# Patient Record
Sex: MALE | Race: WHITE
[De-identification: names, ages, dates, MRNs, and addresses within clinical notes are randomized per-mention and may not be internally consistent; named-entity substitution may affect disease eponyms.]

---

## 2018-09-20 ENCOUNTER — HOSPITAL ENCOUNTER (OUTPATIENT)
Dept: HOSPITAL 47 - OR | Age: 73
Discharge: HOME | End: 2018-09-20
Attending: OTOLARYNGOLOGY
Payer: MEDICARE

## 2018-09-20 VITALS — SYSTOLIC BLOOD PRESSURE: 157 MMHG | RESPIRATION RATE: 20 BRPM | DIASTOLIC BLOOD PRESSURE: 78 MMHG | HEART RATE: 73 BPM

## 2018-09-20 VITALS — BODY MASS INDEX: 29.8 KG/M2

## 2018-09-20 VITALS — TEMPERATURE: 97.6 F

## 2018-09-20 DIAGNOSIS — J33.8: ICD-10-CM

## 2018-09-20 DIAGNOSIS — I10: ICD-10-CM

## 2018-09-20 DIAGNOSIS — Z85.46: ICD-10-CM

## 2018-09-20 DIAGNOSIS — J43.9: ICD-10-CM

## 2018-09-20 DIAGNOSIS — E11.9: ICD-10-CM

## 2018-09-20 DIAGNOSIS — Z79.52: ICD-10-CM

## 2018-09-20 DIAGNOSIS — Z79.899: ICD-10-CM

## 2018-09-20 DIAGNOSIS — Z88.8: ICD-10-CM

## 2018-09-20 DIAGNOSIS — Z79.82: ICD-10-CM

## 2018-09-20 DIAGNOSIS — Z79.891: ICD-10-CM

## 2018-09-20 DIAGNOSIS — J32.4: Primary | ICD-10-CM

## 2018-09-20 DIAGNOSIS — Z79.2: ICD-10-CM

## 2018-09-20 DIAGNOSIS — J34.3: ICD-10-CM

## 2018-09-20 DIAGNOSIS — J34.2: ICD-10-CM

## 2018-09-20 DIAGNOSIS — Z79.51: ICD-10-CM

## 2018-09-20 DIAGNOSIS — I42.2: ICD-10-CM

## 2018-09-20 LAB
ANION GAP SERPL CALC-SCNC: 9 MMOL/L
BUN SERPL-SCNC: 12 MG/DL (ref 9–20)
CALCIUM SPEC-MCNC: 9.4 MG/DL (ref 8.4–10.2)
CHLORIDE SERPL-SCNC: 107 MMOL/L (ref 98–107)
CO2 SERPL-SCNC: 28 MMOL/L (ref 22–30)
GLUCOSE SERPL-MCNC: 110 MG/DL (ref 74–99)
POTASSIUM SERPL-SCNC: 4.2 MMOL/L (ref 3.5–5.1)
SODIUM SERPL-SCNC: 144 MMOL/L (ref 137–145)

## 2018-09-20 PROCEDURE — 31253 NSL/SINS NDSC TOTAL: CPT

## 2018-09-20 PROCEDURE — 30140 RESECT INFERIOR TURBINATE: CPT

## 2018-09-20 PROCEDURE — 88305 TISSUE EXAM BY PATHOLOGIST: CPT

## 2018-09-20 PROCEDURE — 88300 SURGICAL PATH GROSS: CPT

## 2018-09-20 PROCEDURE — 30520 REPAIR OF NASAL SEPTUM: CPT

## 2018-09-20 PROCEDURE — 31267 ENDOSCOPY MAXILLARY SINUS: CPT

## 2018-09-20 PROCEDURE — 80048 BASIC METABOLIC PNL TOTAL CA: CPT

## 2018-09-20 PROCEDURE — 31259 NSL/SINS NDSC SPHN TISS RMVL: CPT

## 2018-09-20 RX ADMIN — OXYMETAZOLINE HCL ONE SPRAY: 0.05 SPRAY NASAL at 06:50

## 2018-09-20 RX ADMIN — OXYMETAZOLINE HCL ONE SPRAY: 0.05 SPRAY NASAL at 06:36

## 2018-09-20 RX ADMIN — OXYMETAZOLINE HCL ONE SPRAY: 0.05 SPRAY NASAL at 07:05

## 2018-09-20 RX ADMIN — OXYMETAZOLINE HCL ONE SPRAY: 0.05 SPRAY NASAL at 07:00

## 2018-09-20 RX ADMIN — OXYMETAZOLINE HCL ONE SPRAY: 0.05 SPRAY NASAL at 06:41

## 2018-09-20 NOTE — P.OP
Date of Procedure: 09/20/18


Preoperative Diagnosis: 


Deviated nasal septum


Bilateral hypertrophy of inferior nasal turbinates with obstruction


Chronic pansinusitis


Sinonasal polyposis


Postoperative Diagnosis: 


Same


Procedure(s) Performed: 


Septoplasty


Bilateral submucosal resection of the inferior turbinates with outfracturing 

compression


Bilateral functional endoscopic sinus surgery of all sinuses with polypectomy 

and intranasal polypectomy


Anesthesia: BRENTON


Surgeon: Pipo Stanley


Estimated Blood Loss (ml): 40


Pathology: other (Sinonasal)


Condition: stable


Disposition: PACU


Indications for Procedure: 


This patient presented to the office with multiple and long-standing sinonasal 

symptoms.  He has chronic pansinusitis with polyps and left septal deviation.  

He has complaints of discolored postnasal drainage, total anosmia, facial pain 

and pressure, discolored drainage, etc. etc.  He has failed medical therapy and 

is yellow drainage is persistent.  His problems have been going on for years 

and has failed medical therapy.  CAT scan evaluation confirms chronic 

pansinusitis.  This is a medical failure and after long discussion we decided 

to proceed forward with this septoplasty turbinate surgery and bilateral 

functional endoscopic sinus surgery and polypectomy.  All risks, benefits, and 

alternative therapies were discussed.  Consent was obtained and all questions 

were answered.


Operative Findings: 


Massive intranasal edema with a left septal deviation and sinonasal polyposis 

throughout all sinuses with pipo pus and thick and viscous secretions removed.

  All sinuses were infected and had polyposis.


Description of Procedure: 


This patient was taken to the operative room and placed in the supine position.

  A general inhalation anesthetic was administered to the patient by the 

department of anesthesia with a functioning IV line in place.  The patient was 

monitored throughout the entire case by the department of anesthesia.  The eyes 

were taped shut for protection.  The patient was placed in a slight reverse 

Trendelenburg position.  The patient had previously utilize Afrin nasal spray 

preoperatively.  The nose was evaluated and the septum lateral nasal wall and 

inferior turbinates were injected with lidocaine 1% with epinephrine 1 100,000 

bilaterally.  Approximately 10 minutes were allowed wait for full 

vasoconstrictive effects to take place.





At this point a caudal incision was made over the caudal portion of the left 

septum down to the mucoperichondrium.  A mucoperichondrial flap was elevated on 

the left side and dissection was carried with use of tunnels posteriorly.  We 

then made a crossover incision through the cartilage to the contralateral side 

and for the mucoperichondrial flap development was performed to the extent of 

visualization on the contralateral side.  After the cartilage was freed with 

use of several crosshatching incisions and removal of some redundant strips of 

septal cartilage, the septum was straightened and placed back in the midline.  

The septum was sutured fixated to the ovarian groove.  Excellent straightening 

occurred and the septum was visibly straight.  Incision was closed with a 40 

rapid Vicryl.  We utilized a running nonlocking fashion for closure of the 

incision.  A quilting stitch was used to reapproximate the septal flaps with 

use of a 40 rapid Vicryl.





We then entered the nose with a 0 and 30 Méndez claus endoscope.  Previous 

to this we did inject the lateral nasal wall and middle turbinate and uncinate 

process with lidocaine 1% with epinephrine 1 100,000.  Approximately 10 minutes 

were allowed wait for full vasoconstrictive effects to take place.  Intranasal 

polyps were noted.  They were noted bilaterally.  The intranasal polyps were 

removed with use of a microdebrider.  With use of a microdebrider and a 

pediatric backbiter, we took down the uncinate process bilaterally.  We then 

opened the maxillary sinuses bilaterally.  We utilized a microdebrider for this 

and entered the maxillary sinuses and removed diseased tissue and polypoid 

tissue.  This was done bilaterally.  After the maxillary sinuses were opened 

and the diseased tissue and polyps were removed we entered the ethmoid bulla 

and with use of a microdebrider and up-biting Ludivina, we remove the 

anterior septations and remove diseased tissue from the anterior ethmoids with 

direct visualization.  We then followed the fovea frontalis through the basal 

lamella and into the posterior ethmoid air cells and did a total ethmoidectomy 

with removal of polypoid material.  Once the ethmoids  cells were all taken 

down we then entered the sphenoid sinus medially and inferiorly underneath the 

inferior attachment of the superior turbinate.  The sphenoid sinus was opened 

entered and diseased tissue and polyps were removed bilaterally.  This was done 

with a microdebrider and Blakesley.  We then entered the frontal sinuses with a 

giraffe and up-biting Blakesley entered on the agar nasi cells.  We open the 

frontal sinuses and removed sinus tissue and polypoid tissue that was diseased.

  We explored the frontal sinuses bilaterally.  To summarize all sinuses were 

open all sinuses were explored and we remove diseased tissue and polyps from 

the sphenoid maxillary and frontal sinuses.  Polyps were removed from the nose.

  Ethmoid sinuses were opened totally. Surgicel powder was inserted and minimal 

bleeding was encountered.  We reinspected the skull base there is no signs of 

any orbital penetration or signs of any intracranial penetration.  The sugical 

site was reinspected after the nasal pore was placed and no bleeding was seen.











Attention was then paid to the inferior turbinates.  The bilateral inferior 

turbinates were hypertrophic and obstructive.  We entered the anterior portion 

of the inferior turbinates with use of a microdebrider.  We remove bone and 

submucosal elements with use of a microdebrider bilaterally.  The inferior 

turbinates underwent a submucosal resection with removal of submucosal tissue 

and bone.  We obtained a much better and normal in size for breathing.  The 

inferior turbinates were then outfractured and compressed with a [employer] nasal 

elevator.  Excellent airway was obtained and was symmetric bilaterally.  No 

bleeding was encountered.





Intranasal splints were inserted and fixated at the end of the case.  We 

utilized [place] nasal splints.  There will be removed and the patient returns to 

the office.

## 2025-01-17 ENCOUNTER — HOSPITAL ENCOUNTER (OUTPATIENT)
Dept: HOSPITAL 47 - LABPAT | Age: 80
Discharge: HOME | End: 2025-01-17
Attending: ORTHOPAEDIC SURGERY
Payer: MEDICARE

## 2025-01-17 DIAGNOSIS — M16.11: ICD-10-CM

## 2025-01-17 DIAGNOSIS — Z01.818: Primary | ICD-10-CM

## 2025-01-17 DIAGNOSIS — Z22.322: ICD-10-CM

## 2025-01-17 LAB
ALBUMIN SERPL-MCNC: 4.1 G/DL (ref 3.8–4.9)
ALBUMIN/GLOB SERPL: 1.58 RATIO (ref 1.6–3.17)
ALP SERPL-CCNC: 70 U/L (ref 41–126)
ALT SERPL-CCNC: 13 U/L (ref 10–49)
ANION GAP SERPL CALC-SCNC: 11.1 MMOL/L (ref 4–12)
APTT BLD: 22.9 SEC (ref 22–30)
AST SERPL-CCNC: 16 U/L (ref 14–35)
BUN SERPL-SCNC: 13.5 MG/DL (ref 9–27)
BUN/CREAT SERPL: 16.88 RATIO (ref 12–20)
CALCIUM SPEC-MCNC: 9.5 MG/DL (ref 8.7–10.3)
CHLORIDE SERPL-SCNC: 103 MMOL/L (ref 96–109)
CO2 SERPL-SCNC: 26.9 MMOL/L (ref 21.6–31.8)
ERYTHROCYTE [DISTWIDTH] IN BLOOD BY AUTOMATED COUNT: 4.2 X 10*6/UL (ref 4.4–5.6)
ERYTHROCYTE [DISTWIDTH] IN BLOOD: 12.5 % (ref 11.5–14.5)
GLOBULIN SER CALC-MCNC: 2.6 G/DL (ref 1.6–3.3)
GLUCOSE SERPL-MCNC: 89 MG/DL (ref 70–110)
HCT VFR BLD AUTO: 38.4 % (ref 39.6–50)
HGB BLD-MCNC: 12.8 G/DL (ref 13–17)
INR PPP: 0.9 (ref ?–1.2)
MCH RBC QN AUTO: 30.5 PG (ref 27–32)
MCHC RBC AUTO-ENTMCNC: 33.3 G/DL (ref 32–37)
MCV RBC AUTO: 91.4 FL (ref 80–97)
NRBC BLD AUTO-RTO: 0 X 10*3/UL (ref 0–0.01)
PLATELET # BLD AUTO: 200 X 10*3/UL (ref 140–440)
POTASSIUM SERPL-SCNC: 4.1 MMOL/L (ref 3.5–5.5)
PROT SERPL-MCNC: 6.7 G/DL (ref 6.2–8.2)
PT BLD: 10.1 SEC (ref 10–12.5)
SODIUM SERPL-SCNC: 141 MMOL/L (ref 135–145)
WBC # BLD AUTO: 6.2 X 10*3/UL (ref 4.5–10)

## 2025-01-17 PROCEDURE — 80053 COMPREHEN METABOLIC PANEL: CPT

## 2025-01-17 PROCEDURE — 86850 RBC ANTIBODY SCREEN: CPT

## 2025-01-17 PROCEDURE — 87070 CULTURE OTHR SPECIMN AEROBIC: CPT

## 2025-01-17 PROCEDURE — 86901 BLOOD TYPING SEROLOGIC RH(D): CPT

## 2025-01-17 PROCEDURE — 85730 THROMBOPLASTIN TIME PARTIAL: CPT

## 2025-01-17 PROCEDURE — 85610 PROTHROMBIN TIME: CPT

## 2025-01-17 PROCEDURE — 85027 COMPLETE CBC AUTOMATED: CPT

## 2025-01-17 PROCEDURE — 93005 ELECTROCARDIOGRAM TRACING: CPT

## 2025-01-17 PROCEDURE — 86900 BLOOD TYPING SEROLOGIC ABO: CPT

## 2025-01-27 ENCOUNTER — HOSPITAL ENCOUNTER (OUTPATIENT)
Dept: HOSPITAL 47 - OR | Age: 80
LOS: 1 days | Discharge: HOME HEALTH | End: 2025-01-28
Attending: ORTHOPAEDIC SURGERY
Payer: MEDICARE

## 2025-01-27 DIAGNOSIS — M16.11: Primary | ICD-10-CM

## 2025-01-27 DIAGNOSIS — I25.10: ICD-10-CM

## 2025-01-27 DIAGNOSIS — Z79.51: ICD-10-CM

## 2025-01-27 DIAGNOSIS — J44.9: ICD-10-CM

## 2025-01-27 DIAGNOSIS — I42.2: ICD-10-CM

## 2025-01-27 DIAGNOSIS — E11.9: ICD-10-CM

## 2025-01-27 DIAGNOSIS — Z79.84: ICD-10-CM

## 2025-01-27 DIAGNOSIS — I10: ICD-10-CM

## 2025-01-27 DIAGNOSIS — G89.18: ICD-10-CM

## 2025-01-27 DIAGNOSIS — Z79.82: ICD-10-CM

## 2025-01-27 LAB
GLUCOSE BLD-MCNC: 125 MG/DL (ref 70–110)
GLUCOSE BLD-MCNC: 160 MG/DL (ref 70–110)
GLUCOSE BLD-MCNC: 175 MG/DL (ref 70–110)

## 2025-01-27 PROCEDURE — 27130 TOTAL HIP ARTHROPLASTY: CPT

## 2025-01-27 PROCEDURE — 85025 COMPLETE CBC W/AUTO DIFF WBC: CPT

## 2025-01-27 PROCEDURE — 97161 PT EVAL LOW COMPLEX 20 MIN: CPT

## 2025-01-27 PROCEDURE — 64999 UNLISTED PX NERVOUS SYSTEM: CPT

## 2025-01-27 PROCEDURE — 97166 OT EVAL MOD COMPLEX 45 MIN: CPT

## 2025-01-27 PROCEDURE — 94640 AIRWAY INHALATION TREATMENT: CPT

## 2025-01-27 PROCEDURE — 73501 X-RAY EXAM HIP UNI 1 VIEW: CPT

## 2025-01-27 PROCEDURE — 83036 HEMOGLOBIN GLYCOSYLATED A1C: CPT

## 2025-01-27 RX ADMIN — Medication SCH MCG: at 20:36

## 2025-01-27 RX ADMIN — CEFAZOLIN ONE MLS: 330 INJECTION, POWDER, FOR SOLUTION INTRAMUSCULAR; INTRAVENOUS at 06:57

## 2025-01-27 RX ADMIN — MELOXICAM PRN MG: 7.5 TABLET ORAL at 06:32

## 2025-01-27 RX ADMIN — DOCUSATE SODIUM AND SENNOSIDES SCH EACH: 50; 8.6 TABLET ORAL at 20:36

## 2025-01-27 RX ADMIN — GABAPENTIN PRN MG: 300 CAPSULE ORAL at 06:32

## 2025-01-27 RX ADMIN — POTASSIUM CHLORIDE ONE MLS: 14.9 INJECTION, SOLUTION INTRAVENOUS at 05:58

## 2025-01-27 RX ADMIN — ONDANSETRON ONE MG: 2 INJECTION INTRAMUSCULAR; INTRAVENOUS at 06:38

## 2025-01-27 RX ADMIN — BUDESONIDE AND FORMOTEROL FUMARATE DIHYDRATE SCH PUFF: 160; 4.5 AEROSOL RESPIRATORY (INHALATION) at 20:43

## 2025-01-27 RX ADMIN — NIFEDIPINE SCH MG: 30 TABLET, FILM COATED, EXTENDED RELEASE ORAL at 17:28

## 2025-01-27 RX ADMIN — METOPROLOL TARTRATE SCH MG: 50 TABLET, FILM COATED ORAL at 20:35

## 2025-01-27 RX ADMIN — ASPIRIN 325 MG ORAL TABLET SCH MG: 325 PILL ORAL at 20:35

## 2025-01-27 RX ADMIN — MIDAZOLAM PRN MG: 1 INJECTION INTRAMUSCULAR; INTRAVENOUS at 06:34

## 2025-01-27 RX ADMIN — HYDROMORPHONE HYDROCHLORIDE PRN MG: 1 INJECTION, SOLUTION INTRAMUSCULAR; INTRAVENOUS; SUBCUTANEOUS at 13:22

## 2025-01-27 RX ADMIN — HYDROMORPHONE HYDROCHLORIDE PRN MG: 1 INJECTION, SOLUTION INTRAMUSCULAR; INTRAVENOUS; SUBCUTANEOUS at 10:24

## 2025-01-27 RX ADMIN — POTASSIUM CHLORIDE ONE MLS: 14.9 INJECTION, SOLUTION INTRAVENOUS at 08:33

## 2025-01-27 RX ADMIN — ACETAMINOPHEN PRN MG: 500 TABLET ORAL at 06:32

## 2025-01-27 RX ADMIN — POTASSIUM CHLORIDE SCH MEQ: 750 TABLET, EXTENDED RELEASE ORAL at 20:36

## 2025-01-27 RX ADMIN — BETAMETHASONE DIPROPIONATE SCH: 0.5 OINTMENT TOPICAL at 21:47

## 2025-01-27 RX ADMIN — SODIUM CHLORIDE ONE MG: 9 INJECTION, SOLUTION INTRAVENOUS at 08:18

## 2025-01-27 RX ADMIN — CEFAZOLIN SCH: 330 INJECTION, POWDER, FOR SOLUTION INTRAMUSCULAR; INTRAVENOUS at 13:21

## 2025-01-27 RX ADMIN — SODIUM CHLORIDE ONE MG: 9 INJECTION, SOLUTION INTRAVENOUS at 07:40

## 2025-01-27 RX ADMIN — POTASSIUM CHLORIDE SCH MLS/HR: 14.9 INJECTION, SOLUTION INTRAVENOUS at 06:32

## 2025-01-27 RX ADMIN — INSULIN ASPART SCH UNIT: 100 INJECTION, SOLUTION INTRAVENOUS; SUBCUTANEOUS at 17:28

## 2025-01-27 RX ADMIN — HYDROCODONE BITARTRATE AND ACETAMINOPHEN PRN EACH: 7.5; 325 TABLET ORAL at 16:32

## 2025-01-27 NOTE — P.ANPRN
Procedure Note - Anesthesia





- Nerve Block Performed


  ** Right Brian Single


Time Out Performed: Yes


Date of Procedure: 01/27/25


Procedure Start Time: 06:33


Procedure Stop Time: 06:38


Location of Patient: PreOp


Indication: Acute Post-Operative Pain, Analgesia, Requested by Surgeon


Sedation Type: Sedate with meaningful contact maintained


Position: Supine


Catheter: None


Needle Types: Pajunk


Needle Gauge: 21


Ultrasound used to visualize needle placement: Yes


Ultrasound used to observe medication spread: Yes


Injectate: 0.5% Ropivacaine (see comment for volume) (Ropiv 20ml)


Blood Aspirated: No


Pain Paresthesia on Injection Noted: No


Resistance on Injection: Normal


Image Stored and Saved: Yes


Events: Uneventful and Well Tolerated

## 2025-01-27 NOTE — P.OP
Date of Procedure: 01/27/25


Preoperative Diagnosis: 


Severe osteoarthritis, right hip


Postoperative Diagnosis: 


Severe osteoarthritis, right


Procedure(s) Performed: 


Right total hip arthroplasty with a direct anterior approach


Implants: 


Smith & Nephew Polarstem standard size 3 with a collar


Smith & Nephew R3, 3 hole hemispherical acetabular shell, 58 mm


Smith & Nephew Reflection 6.5 mm cancellus screws, 20 mm, 25 mm


Smith [employer] Nephew R3, XLPE 20 acetabular liner 


Smith & Nephew Oxinium femoral head 36 mm, +0


All components were press-fit.


The articulation is Oxinium on polyethylene.


Anesthesia: spinal


Surgeon: Von Doss


Assistant #1: Juani Morrison


Estimated Blood Loss (ml): 250


Pathology: none sent


Condition: stable


Disposition: PACU


Indications for Procedure: 


After failure of conservative treatment we discussed the surgical and no

nsurgical treatment options at length.  Patient wishes to proceed with a total 

hip arthroplasty with a direct anterior approach.  Complications specific to 

this procedure were discussed at length, including but not limited to infection,

leg length discrepancy, dislocation, nerve injury, and fracture.  Covid-19 was 

also discussed at length with the patient, and they are aware of the current 

policies and procedures.  The patient was given the option of delaying surgery, 

but they elect to proceed knowing these risks.  Patient is aware of all these 

complications and informed consent was obtained


Operative Findings: 


The operative findings are consistent with severe osteoarthritis of the right 

hip


Description of Procedure: 


The patient was seen and evaluated in the preoperative area and the consent was 

reviewed.  The operative site was marked with a skin marker.  The patient 

verified the procedure and operative site.  A PENG block was placed by 

anesthesia in the preoperative area. The patient was then brought to the 

operating room and given preoperative antibiotics intravenously.  1 g of 

Tranexamic acid was also given intravenously.  A spinal anesthetic was 

administered by the anesthesia department.   The patient was then placed on the 

Harbert table with the bony prominences well-padded.  The hip area was then prepped

with a ChloraPrep solution and draped in the usual sterile fashion.





A universal timeout was then performed, which confirmed the patient's name, 

surgical site, ALLERGIES, and procedure being performed on the consent.  Next 

the incision site was located at 1 cm distal and 4 cm lateral to the anterior 

superior iliac spine.  The skin and subcutaneous tissues were sharply incised.  

Incision was carefully dissected down to the fascia overlying the tensor fascia 

doretha muscle.  This fascia was then incised in line with the muscle fibers.  Care

was taken to stay laterally in order to avoid injuring the lateral femoral 

cutaneous nerve.  Next, using blunt finger dissection, the tensor fascia doretha 

muscle was dissected off its investing fascia.  The muscle was then carefully 

retracted laterally with a cobra retractor over the lateral neck of the femur.  

Next, the circumflex vessels were identified and cauterized using the Aquamantis

device.  The anterior hip capsule was then exposed.  The capsule was then opened

and an inverted T fashion.  The retractors were then placed intracapsularly.  

The retractors were maintained intracapsular throughout the procedure.  The 

proximal femur was then visualized.  Fluoroscopic x-rays were then taken in 

order to evaluate the preoperative leg lengths.  A small amount of traction was 

placed on the leg.





The femoral neck was then osteotomized at the appropriate level above the lesser

trochanter.  A small wedge of bone was then removed from the remaining femoral 

head.  Next, using a corkscrew the femoral head was removed from the acetabulum.

 On gross visual inspection, the femoral head had complete loss of articular 

cartilage and multiple periarticular osteophytes.  The femoral head was then 

measured.  Attention was then turned to the acetabulum.





The acetabulum was exposed and any remaining labrum was excised.  Sequential 

reaming of the acetabulum was performed using fluoroscopic guidance until there 

was a good bed of bleeding cancellus bone.  When the appropriate size was 

reached, a trial was then placed.  The position and fit of the trial was checked

with fluoroscopy.  The trial was then removed.  Then, using fluoroscopic 

guidance, the final implant was impacted at 20 of anteversion and 40 of 

abduction, and fully seated in the acetabulum.  2 screws were then placed in the

acetabulum.  Again fluoroscopy was used to check position of the screws.  Next, 

the liner was then impacted, with a 20 elevated liner located in the anterior 

superior quadrant.  Component locking was confirmed.





Attention was then directed to the femur.  With the aid of the Harbert table, the 

femur was externally rotated to approximately 130, extended, and adducted under

the opposite leg.  A side hook was then placed under the proximal femur, and the

side hook elevator was used to elevate the proximal femur while releasing the 

capsule.  Retractors were then placed.  A capsular release was performed, as 

well as a release of the conjoined tendon, which afforded excellent 

visualization of the proximal femur.  Next, a box osteotome was used to 

lateralize the proximal femur.  A  was then used to locate the 

femoral canal.  Sequential broaching was then performed with appropriate size 

which afforded excellent fixation in the proximal femur.  A trial was then 

placed with appropriate head and neck, and the hip was gently reduced with the 

aid of the Harbert table.  Fluoroscopy was then used to check position of the 

components, as well as to evaluate the leg lengths and offset.  The leg lengths 

and offset were measured as closely as possible to ensure stability of the hip. 

The hip was then gently dislocated and the trials were then removed.  Final 

implants were then impacted and the hip was again reduced.  Final fluoroscopic 

x-rays confirmed that the components were in anatomic position.  The leg lengths

and offset were measured and were found to coincide with the trial measurements.

 The hip was also taken through range of motion, and found to be stable.





The hip was then copiously irrigated with antibiotic solution with pulsatile 

lavage.  The hip was then irrigated with Irrisept solution.  The soft tissues 

were then injected with a ropivacaine solution.  A second dose of 1 g of 

Tranexamic acid was also given intravenously. 





The fascia was then closed with 2-0 strata fix suture.  The subcutaneous tissue 

was closed with 3-0 Vicryl.  The subcuticular tissue was closed with 3-0 moncryl

suture.  The skin was then closed with Exofin skin glue.  After the glue and 

dried, and Optifoam silver impregnated dressing was applied.  The patient was t

hen transferred to the recovery room in stable condition.





The assistant  CARLTON Mascorro was required due to the complexity of surgery, 

and the need for skilled surgical assistant for positioning, draping, exposure, 

retraction, and closure of the wound.

## 2025-01-27 NOTE — XR
EXAMINATION TYPE: XR Hip Limited RT

 

DATE OF EXAM: 1/27/2025 9:13 AM

 

INDICATION: 

Patient age:Male;  79 years old; 

Reason for study: Status post hip surgery, assess surgical alignment; PHH. 

 

COMPARISON: Right hip fluoroscopic images of the same date

 

TECHNIQUE: The right hip was examined in the single frontal projection.

 

FINDINGS: Post surgical changes from total right hip arthroplasty. Hardware appears intact with appro
priate alignment on this single frontal image. There is surrounding soft tissue gas and edema related
 to surgery. Vascular sclerosis demonstrated. No acute fracture or dislocation.

 

IMPRESSION: 

Postsurgical changes from total right hip arthroplasty. Hardware appears intact with appropriate alig
nment.

 

[facility], Workstation: [id number], 1/27/2025 9:15 AM

## 2025-01-27 NOTE — XR
Intraoperative/procedural fluoroscopic services were provided for right total hip arthroplasty. Total
 fluoroscopy time is 25.5 seconds with a total of 4 submitted images to PACS. Total DAP 1.2833 Gycm2.
  Please see the operative note for further details.

 

[facility], Workstation: [id number], 1/27/2025 8:35 AM

## 2025-01-28 VITALS — TEMPERATURE: 99.9 F | HEART RATE: 74 BPM | DIASTOLIC BLOOD PRESSURE: 67 MMHG | SYSTOLIC BLOOD PRESSURE: 144 MMHG

## 2025-01-28 VITALS — RESPIRATION RATE: 18 BRPM

## 2025-01-28 LAB
BASOPHILS # BLD AUTO: 0.04 X 10*3/UL (ref 0–0.1)
BASOPHILS NFR BLD AUTO: 0.5 %
EOSINOPHIL # BLD AUTO: 0.08 X 10*3/UL (ref 0.04–0.35)
EOSINOPHIL NFR BLD AUTO: 1 %
ERYTHROCYTE [DISTWIDTH] IN BLOOD BY AUTOMATED COUNT: 3.61 X 10*6/UL (ref 4.4–5.6)
ERYTHROCYTE [DISTWIDTH] IN BLOOD: 12.5 % (ref 11.5–14.5)
GLUCOSE BLD-MCNC: 143 MG/DL (ref 70–110)
GLUCOSE BLD-MCNC: 153 MG/DL (ref 70–110)
HCT VFR BLD AUTO: 33 % (ref 39.6–50)
HGB BLD-MCNC: 10.9 G/DL (ref 13–17)
IMM GRANULOCYTES BLD QL AUTO: 0.3 %
LYMPHOCYTES # SPEC AUTO: 0.66 X 10*3/UL (ref 0.9–5)
LYMPHOCYTES NFR SPEC AUTO: 8.5 %
MCH RBC QN AUTO: 30.2 PG (ref 27–32)
MCHC RBC AUTO-ENTMCNC: 33 G/DL (ref 32–37)
MCV RBC AUTO: 91.4 FL (ref 80–97)
MONOCYTES # BLD AUTO: 0.87 X 10*3/UL (ref 0.2–1)
MONOCYTES NFR BLD AUTO: 11.2 %
NEUTROPHILS # BLD AUTO: 6.08 X 10*3/UL (ref 1.8–7.7)
NEUTROPHILS NFR BLD AUTO: 78.5 %
NRBC BLD AUTO-RTO: 0 X 10*3/UL (ref 0–0.01)
PLATELET # BLD AUTO: 173 X 10*3/UL (ref 140–440)
WBC # BLD AUTO: 7.75 X 10*3/UL (ref 4.5–10)

## 2025-01-28 RX ADMIN — Medication SCH MG: at 08:37

## 2025-01-28 RX ADMIN — ATORVASTATIN CALCIUM SCH MG: 10 TABLET, FILM COATED ORAL at 08:37

## 2025-01-28 RX ADMIN — LOSARTAN POTASSIUM AND HYDROCHLOROTHIAZIDE SCH EACH: 12.5; 5 TABLET ORAL at 08:38

## 2025-01-28 NOTE — CONS
CONSULTATION



REASON FOR CONSULTATION:

Advice regarding COPD and CAD, and other medical issues, requested by 
Orthopedics.



HISTORY OF PRESENT ILLNESS:

This is a 79-year-old gentleman with a past history of COPD, diabetes mellitus,

hypertension, hyperlipidemia, being followed by   in the outpatient setting.

The patient underwent right total hip joint arthroplasty.  There is no history 
of any

fever, rigors, or chill. No history of headache, loss or consciousness, or 
seizures at

this time.



PAST MEDICAL HISTORY:

Reviewed include diabetes mellitus, COPD.  Rest of the history and rest of the 
chart is

also reviewed.



HOME MEDICATIONS:

Reviewed include Requip. Dose and rest of medications reviewed.



ALLERGIES:

Prednisone.



FAMILY HISTORY:

History of borderline diabetes.



SOCIAL HISTORY:

No history of smoking or alcohol.



REVIEW OF SYSTEMS:

Fourteen-point review of systems is negative except as mentioned earlier.



PHYSICAL EXAMINATION:

VITAL SIGNS: Pulse 58, blood pressure 148/70, respirations 16.

HEENT:  Conjunctivae normal.

NECK: No JVD.

CARDIOVASCULAR: S1, S2.

RESPIRATIONS: Breath sounds diminished at the bases. No rhonchi. No crackles.

ABDOMEN:  Soft.

LEGS: Status post surgery.

NERVOUS SYSTEM: Nonfocal.



LABORATORY DATA:

Glucose 125.



ASSESSMENT:

1. Status post right hip arthroplasty.

2. Diabetes mellitus, type 2.

3. Chronic obstructive pulmonary disease.

4. Coronary artery disease.

5. Hypertension.

6. Multiple medical issues.

7. History of hypertrophic cardiomyopathy.



RECOMMENDATIONS AND DISCUSSION:

This is a 79-year-old gentleman presented with multiple complex medical issues, 
we will

monitor the patient closely.  The patient is currently medically stable.  
Recommend to

resume the home medications.  Monitor blood sugars closely.  DVT prophylaxis.  
Pain

management.  We will continue to monitor.  Further recommendations to follow.





MMODL / IJN: [id number] / Job#: [id number]

MTDD

## 2025-01-28 NOTE — P.DS
Providers


Expected date of discharge: 01/28/25


Attending physician: 


Von Doss





Consults: 





                                        





01/27/25 08:51


Consult Physician Routine 


   Consulting Provider: Ly Moreno


   Consult Reason/Comments: medical management


   Do you want consulting provider notified?: Yes











Primary care physician: 


Brandon Martinez








- Discharge Diagnosis(es)


(1) Osteoarthritis of right hip


Current Visit: Yes   Status: Acute   





(2) S/P total hip arthroplasty


Current Visit: Yes   Status: Acute   


Hospital Course: 





This is a 79-year-old male with known history of degenerative arthritis of the 

right hip.  The patient presented for evaluation as an outpatient.  After 

discussion and consideration patient elects to proceed with total hip 

arthroplasty.  The patient is seen preoperatively by Dr. Doss and medically 

cleared for surgery by their primary care physician.





Patient is admitted to [facility] on 1/27/2025 for total hip 

arthroplasty.  The procedure is performed without complication or sequelae.  The

patient is doing well postoperatively.  Labs and vital signs are stable on day 

of discharge.





On day of discharge patient's hip incision is healing well.  There is minimal 

erythema.  There is no drainage noted at this time.  There is minimal soft 

tissue swelling to the hip and thigh.  Patient has full foot and ankle motion 

without difficulty or pain.  Calf is soft and nontender to palpation.  

Neurovascular status to the right lower extremity is intact.  Patient is 

discharged home in good condition. Please see med rec for accurate list of home 

medications.





Plan - Discharge Summary


Discharge Rx Participant: No


New Discharge Prescriptions: 


New


   HYDROcodone/APAP 7.5-325MG [Norco 7.5-325] 1 - 2 tab PO Q6H PRN #32 tab


     PRN Reason: Pain


   Aspirin 325 mg PO BID #60 tab


   Sennosides [Senokot] 2 tab PO DAILY PRN #60 tablet


     PRN Reason: Constipation





No Action


   Cholecalciferol [Vitamin D3 (25 Mcg = 1000 Iu)] 1,000 unit PO BID


   Albuterol Sulfate [Proair Hfa] 4 puff INHALATION RT-Q6H PRN


     PRN Reason: sob


   Montelukast Sodium [Singulair] 10 mg PO DAILY PRN


     PRN Reason: allergies


   Mometasone/Formoterol [Dulera 200 Mcg-5 Mcg Inhaler] 2 puff INHALATION RT-BID


   rOPINIRole HCL [Requip] 0.5 mg PO HS


   Vits A,C,E/Lutein/Minerals [Ocuvite with Lutein Tablet] 1 tab PO DAILY


   Potassium Chloride ER [K-Dur 10] 10 meq PO BID


   Furosemide [Lasix] 40 mg PO DAILY PRN


     PRN Reason: leg swelling         


   Losartan/Hydrochlorothiazide [Hyzaar 100-25 Tablet] 1 tab PO DAILY


   glipiZIDE XL [Glucotrol Xl] 5 mg PO DAILY PRN


     PRN Reason: "high blood sugar"     


   Metoprolol Tartrate [Lopressor] 100 mg PO BID


   Atorvastatin [Lipitor] 10 mg PO DAILY


   metFORMIN HCL ER [Glucophage XR] 500 mg PO BID


   Ferrous Sulfate [Feosol] 325 mg PO DAILY


   NIFEdipine [Adalat CC] 30 mg PO AC-SUPPER


   Magnesium 250 mg PO DAILY


   Aspirin [Adult Low Dose Aspirin EC] 81 mg PO DAILY


   Betamethasone Dipropionate [Betamethasone Diprop Augm Gel 0.05%] 1 applic 

NASAL HS


Discharge Medication List





Albuterol Sulfate [Proair Hfa] 4 puff INHALATION RT-Q6H PRN 10/12/17 [History]


Cholecalciferol [Vitamin D3 (25 Mcg = 1000 Iu)] 1,000 unit PO BID 10/12/17 

[History]


Mometasone/Formoterol [Dulera 200 Mcg-5 Mcg Inhaler] 2 puff INHALATION RT-BID 

10/12/17 [History]


Montelukast Sodium [Singulair] 10 mg PO DAILY PRN 10/12/17 [History]


Potassium Chloride ER [K-Dur 10] 10 meq PO BID 10/12/17 [History]


Vits A,C,E/Lutein/Minerals [Ocuvite with Lutein Tablet] 1 tab PO DAILY 10/12/17 

[History]


rOPINIRole HCL [Requip] 0.5 mg PO HS 10/12/17 [History]


Furosemide [Lasix] 40 mg PO DAILY PRN 09/18/18 [History]


Aspirin [Adult Low Dose Aspirin EC] 81 mg PO DAILY 01/21/25 [History]


Atorvastatin [Lipitor] 10 mg PO DAILY 01/21/25 [History]


Betamethasone Dipropionate [Betamethasone Diprop Augm Gel 0.05%] 1 applic NASAL 

HS 01/21/25 [History]


Ferrous Sulfate [Feosol] 325 mg PO DAILY 01/21/25 [History]


Losartan/Hydrochlorothiazide [Hyzaar 100-25 Tablet] 1 tab PO DAILY 01/21/25 

[History]


Magnesium 250 mg PO DAILY 01/21/25 [History]


Metoprolol Tartrate [Lopressor] 100 mg PO BID 01/21/25 [History]


NIFEdipine [Adalat CC] 30 mg PO AC-SUPPER 01/21/25 [History]


glipiZIDE XL [Glucotrol Xl] 5 mg PO DAILY PRN 01/21/25 [History]


metFORMIN HCL ER [Glucophage XR] 500 mg PO BID 01/21/25 [History]


Aspirin 325 mg PO BID #60 tab 01/27/25 [Rx]


HYDROcodone/APAP 7.5-325MG [Norco 7.5-325] 1 - 2 tab PO Q6H PRN #32 tab 01/27/25

[Rx]


Sennosides [Senokot] 2 tab PO DAILY PRN #60 tablet 01/27/25 [Rx]








Follow up Appointment(s)/Referral(s): 


Von Doss DO [Doctor of Osteopathic Medicine] - 2 Weeks


Activity/Diet/Wound Care/Special Instructions: 


Weightbearing as tolerated with walker.


Leave dressing intact.  Dressing may be removed by home care nurse or by patient

in 7 days. Then change dressing twice daily until follow up.


May shower with initial dressing intact and after removal.


If dressing become saturated, please remove.


Please take aspirin 325mg twice daily for 30 days to prevent blood clots.


Recommend use of compression stockings daily until follow up to help prevent 

swelling and blood clots. May remove at night before sleeping. 


Please follow[facility]up with [facility] in [facility] weeks and call with any 

questions or concerns, [phone].








Discharge Disposition: HOME WITH HOME HEALTH SERVICES

## 2025-01-29 NOTE — PN
PROGRESS NOTE



DATE OF SERVICE:  01/28/2025



SUBJECTIVE:

This is a 79-year-old gentleman, who was admitted after right total hip joint

arthroplasty, improving significantly.  No chest pain.  No palpitations.  No fever.



OBJECTIVE:

VITAL SIGNS: Pulse is 74, blood pressure 140/62, respirations 18.

CHEST: Clear to auscultation.

CARDIOVASCULAR: S1, S2.

ABDOMEN: Soft.

NERVOUS SYSTEM: Nonfocal.

LEGS:  Status post surgery.



LABORATORY DATA:

Reviewed. Hemoglobin 10.9.



ASSESSMENT:

1. Status post right total hip joint arthroplasty.

2. Diabetes mellitus, type 2.

3. Chronic obstructive pulmonary disease.

4. Coronary artery disease.

5. Hypertension.

6. Multiple medical issues.



RECOMMENDATIONS AND DISCUSSION:

Recommend to continue current management. Continue symptomatic treatment. Resume home

medications.  Follow with Primary.  DVT prophylaxis and rest of medications per

Orthopedic Surgery.





MMODL / IJN: [id number] / Job#: [id number]